# Patient Record
(demographics unavailable — no encounter records)

---

## 2024-12-20 NOTE — DISCUSSION/SUMMARY
[FreeTextEntry1] : 37 year old P1 presenting for annual exam -f/u pap and GC/CT -Depression Screen done: PHQ 2 - 1 -Contraception: None -f/u PRN

## 2024-12-20 NOTE — PHYSICAL EXAM
[Chaperone Present] : A chaperone was present in the examining room during all aspects of the physical examination [47289] : A chaperone was present during the pelvic exam. [Appropriately responsive] : appropriately responsive [Alert] : alert [No Acute Distress] : no acute distress [Soft] : soft [Non-tender] : non-tender [Non-distended] : non-distended [No HSM] : No HSM [No Lesions] : no lesions [No Mass] : no mass [Oriented x3] : oriented x3 [Examination Of The Breasts] : a normal appearance [No Masses] : no breast masses were palpable [Labia Majora] : normal [Labia Minora] : normal [Normal] : normal [Uterine Adnexae] : normal [FreeTextEntry2] : Gali Rangel

## 2024-12-20 NOTE — HISTORY OF PRESENT ILLNESS
[FreeTextEntry1] :  Patient is a 37 year old P1 presenting for an annual visit. LMP 12/04/2024 . She is feeling well and is without complaints. She denies vaginal itching, odor and discharge. Denies urinary urgency, frequency and dysuria. She is currently sexually active.  OBHx: NSVDx1 MAB x1

## 2025-02-04 NOTE — PLAN
[FreeTextEntry1] : 36 yo female with vaginal itching and discharge: -f/u bd affirm and gc/ct - thick white cottage cheese discharge noted on exam, rx for Diflucan sent to pharmacy -patient started on tx for recurrent yeast infections, rx for Diflucan 150mg weekly x 6 months sent to pharmacy -f/u prn

## 2025-02-04 NOTE — PHYSICAL EXAM
[Chaperone Present] : A chaperone was present in the examining room during all aspects of the physical examination [[id number]] : A chaperone was present during the pelvic exam. [Appropriately responsive] : appropriately responsive [Alert] : alert [No Acute Distress] : no acute distress [Soft] : soft [Non-tender] : non-tender [Non-distended] : non-distended [No HSM] : No HSM [No Lesions] : no lesions [No Mass] : no mass [Oriented x3] : oriented x3 [Labia Majora] : normal [Labia Minora] : normal [Normal] : normal [Uterine Adnexae] : normal [FreeTextEntry2] : Keisha [Discharge] : a  ~M vaginal discharge was present [Heavy] : heavy [White] : white [Thick] : thick

## 2025-02-04 NOTE — HISTORY OF PRESENT ILLNESS
[Regular Cycle Intervals] : periods have been regular [No] : Patient does not have concerns regarding sex [Currently Active] : currently active [Men] : men [FreeTextEntry1] : 01/10/25

## 2025-02-19 NOTE — PHYSICAL EXAM
[No Acute Distress] : no acute distress [Well Nourished] : well nourished [PERRL] : pupils equal round and reactive to light [Thyroid Normal, No Nodules] : the thyroid was normal and there were no nodules present [No Respiratory Distress] : no respiratory distress  [No Accessory Muscle Use] : no accessory muscle use [Clear to Auscultation] : lungs were clear to auscultation bilaterally [Normal Rate] : normal rate  [Regular Rhythm] : with a regular rhythm [Normal S1, S2] : normal S1 and S2 [Soft] : abdomen soft [Grossly Normal Strength/Tone] : grossly normal strength/tone [No Rash] : no rash [Coordination Grossly Intact] : coordination grossly intact [Normal Insight/Judgement] : insight and judgment were intact

## 2025-02-19 NOTE — HISTORY OF PRESENT ILLNESS
[FreeTextEntry1] : Annual [de-identified] : Patient is a 37 year old female who presents for their annual wellness visit.   Patient has no complaints of: fever, chills, dizziness, headache, chest pain, SOB, N/V/D, urinary symptoms, vomiting/diarrhea. Patient reports being in their usual state of health. Continues to struggles with weight loss despite ongoing efforts with diet and exercise. Concerned about her weights impact on her health.   Patient reports that for the past few weeks she has been haing palpitations which she thinks may be related to lack of restful sleep. Has upcoming f/u apt with cardiology - past workup was negative. Denies any other associated symptoms when palpiatios occur including CP, SOB. N/D, diaphoresis.   Has been haing frequent yeast infections, was started by GYN on prophylactic diflucan.   Ashma well controlled, only uses rescue inhaler as needed very infrequently. Follows with pulm.

## 2025-02-19 NOTE — HEALTH RISK ASSESSMENT
[Good] : ~his/her~  mood as  good [Yes] : Yes [2 - 4 times a month (2 pts)] : 2-4 times a month (2 points) [1 or 2 (0 pts)] : 1 or 2 (0 points) [Never (0 pts)] : Never (0 points) [No falls in past year] : Patient reported no falls in the past year [0] : 2) Feeling down, depressed, or hopeless: Not at all (0) [PHQ-2 Negative - No further assessment needed] : PHQ-2 Negative - No further assessment needed [Never] : Never [None] : None [With Family] : lives with family [Employed] : employed [] :  [# Of Children ___] : has [unfilled] children [Fully functional (bathing, dressing, toileting, transferring, walking, feeding)] : Fully functional (bathing, dressing, toileting, transferring, walking, feeding) [Fully functional (using the telephone, shopping, preparing meals, housekeeping, doing laundry, using] : Fully functional and needs no help or supervision to perform IADLs (using the telephone, shopping, preparing meals, housekeeping, doing laundry, using transportation, managing medications and managing finances) [[id number]] : 0 [Change in mental status noted] : No change in mental status noted [Reports changes in hearing] : Reports no changes in hearing [Reports changes in vision] : Reports no changes in vision [de-identified] : PACU rn